# Patient Record
Sex: MALE | Race: WHITE | Employment: PART TIME | URBAN - NONMETROPOLITAN AREA
[De-identification: names, ages, dates, MRNs, and addresses within clinical notes are randomized per-mention and may not be internally consistent; named-entity substitution may affect disease eponyms.]

---

## 2018-02-03 ENCOUNTER — HOSPITAL ENCOUNTER (EMERGENCY)
Age: 68
Discharge: HOME | End: 2018-02-03
Payer: COMMERCIAL

## 2018-02-03 VITALS
WEIGHT: 198.8 LBS | HEART RATE: 88 BPM | BODY MASS INDEX: 29.36 KG/M2 | OXYGEN SATURATION: 96 % | DIASTOLIC BLOOD PRESSURE: 83 MMHG | RESPIRATION RATE: 18 BRPM | TEMPERATURE: 97.9 F | SYSTOLIC BLOOD PRESSURE: 158 MMHG

## 2018-02-03 DIAGNOSIS — J10.1 INFLUENZA A: Primary | ICD-10-CM

## 2018-02-03 DIAGNOSIS — J01.10 ACUTE FRONTAL SINUSITIS, RECURRENCE NOT SPECIFIED: ICD-10-CM

## 2018-02-03 PROCEDURE — 99212 OFFICE O/P EST SF 10 MIN: CPT

## 2018-02-03 PROCEDURE — 99202 OFFICE O/P NEW SF 15 MIN: CPT | Performed by: NURSE PRACTITIONER

## 2018-02-03 RX ORDER — BROMPHENIRAMINE MALEATE, PSEUDOEPHEDRINE HYDROCHLORIDE, AND DEXTROMETHORPHAN HYDROBROMIDE 2; 30; 10 MG/5ML; MG/5ML; MG/5ML
10 SYRUP ORAL 4 TIMES DAILY PRN
Qty: 240 ML | Refills: 0 | Status: SHIPPED | OUTPATIENT
Start: 2018-02-03

## 2018-02-03 RX ORDER — CETIRIZINE HYDROCHLORIDE 10 MG/1
10 TABLET ORAL DAILY
COMMUNITY

## 2018-02-03 RX ORDER — AMOXICILLIN AND CLAVULANATE POTASSIUM 875; 125 MG/1; MG/1
1 TABLET, FILM COATED ORAL 2 TIMES DAILY
Qty: 20 TABLET | Refills: 0 | Status: SHIPPED | OUTPATIENT
Start: 2018-02-03 | End: 2018-02-03

## 2018-02-03 RX ORDER — OSELTAMIVIR PHOSPHATE 75 MG/1
75 CAPSULE ORAL 2 TIMES DAILY
Qty: 10 CAPSULE | Refills: 0 | Status: SHIPPED | OUTPATIENT
Start: 2018-02-03 | End: 2018-02-08

## 2018-02-03 RX ORDER — PREDNISONE 20 MG/1
20 TABLET ORAL 2 TIMES DAILY
Qty: 10 TABLET | Refills: 0 | Status: SHIPPED | OUTPATIENT
Start: 2018-02-03 | End: 2018-02-08

## 2018-02-03 ASSESSMENT — ENCOUNTER SYMPTOMS
SWOLLEN GLANDS: 1
NAUSEA: 1
COLOR CHANGE: 0
ABDOMINAL PAIN: 0
CHEST TIGHTNESS: 1
SORE THROAT: 1
EYE ITCHING: 0
WHEEZING: 1
SINUS PAIN: 1
COUGH: 1
ABDOMINAL DISTENTION: 0
EYE PAIN: 0
SINUS PRESSURE: 1

## 2018-02-03 NOTE — ED TRIAGE NOTES
Pt ambulatory into [facility] with c/o non productive cough and eyes watering that started yesterday. Pt presents with wife with similar symptoms. Pt denies pain at this time.

## 2018-02-03 NOTE — ED PROVIDER NOTES
[id number]  Urgent Care Encounter      CHIEF COMPLAINT       Chief Complaint   Patient presents with    Cough    Other     watery eyes and burns        Nurses Notes reviewed and I agree except as noted in the HPI. HISTORY OF PRESENT ILLNESS   Yonathan Salomon is a 78 y.o. male who presents The history is provided by the patient and the spouse. URI   Presenting symptoms: congestion, cough, facial pain, fatigue, fever and sore throat    Congestion:     Interferes with sleep: yes      Interferes with eating/drinking: yes    Cough:     Cough characteristics:  Barking, hacking and hoarse    Sputum characteristics:  Nondescript    Severity:  Mild    Onset quality:  Sudden    Duration:  3 days    Timing:  Intermittent    Progression:  Worsening    Chronicity:  New  Fatigue:     Severity:  Moderate    Duration:  3 days    Timing:  Intermittent    Progression:  Worsening  Sore throat:     Severity:  Moderate    Onset quality:  Sudden    Duration:  3 days    Timing:  Intermittent    Progression:  Worsening  Severity:  Moderate  Relieved by:  Drinking, OTC medications and rest  Worsened by:  Breathing, certain positions, drinking and eating  Ineffective treatments:  Rest, OTC medications and certain positions  Associated symptoms: arthralgias, headaches, myalgias, sinus pain, swollen glands and wheezing    Risk factors: being elderly, diabetes mellitus, recent illness and sick contacts          REVIEW OF SYSTEMS     Review of Systems   Constitutional: Positive for activity change, appetite change, fatigue and fever. HENT: Positive for congestion, sinus pain, sinus pressure and sore throat. Eyes: Negative for pain and itching. Respiratory: Positive for cough, chest tightness and wheezing. Gastrointestinal: Positive for nausea. Negative for abdominal distention and abdominal pain. Endocrine: Negative for cold intolerance and heat intolerance. Genitourinary: Negative.     Musculoskeletal: Positive for arthralgias and myalgias. Skin: Negative for color change. Allergic/Immunologic: Negative for environmental allergies and food allergies. Neurological: Positive for light-headedness and headaches. Hematological: Positive for adenopathy. Psychiatric/Behavioral: Negative. PAST MEDICAL HISTORY         Diagnosis Date    Arthritis     thumbs    Cancer ([facility] [address] 2016    BCC, left ear    Diabetes mellitus ([facility] [address]     Hypertension        SURGICAL HISTORY     Patient  has a past surgical history that includes Appendectomy (1973); External ear surgery (Left, 1990's); hernia repair (Right, 1990's); Cholecystectomy (2004?); other surgical history (2003/4); Knee arthroscopy (Left, 2014); Tonsillectomy; and Mohs surgery (Left, 12/16/2016). CURRENT MEDICATIONS       Discharge Medication List as of 2/3/2018  5:57 PM      CONTINUE these medications which have NOT CHANGED    Details   Dulaglutide (TRULICITY) 1.5 PG/6.8LK SOPN Inject into the skin Once weeklyHistorical Med      cetirizine (ZYRTEC) 10 MG tablet Take 10 mg by mouth dailyHistorical Med      metFORMIN (GLUCOPHAGE) 500 MG tablet Take 2,000 mg by mouth daily (with breakfast)      sitaGLIPtin (JANUVIA) 100 MG tablet Take 100 mg by mouth daily      allopurinol (ZYLOPRIM) 300 MG tablet Take 300 mg by mouth daily      lisinopril (PRINIVIL;ZESTRIL) 40 MG tablet Take 40 mg by mouth daily      hydrochlorothiazide (HYDRODIURIL) 25 MG tablet Take 25 mg by mouth daily             ALLERGIES     Patient is has No Known Allergies. FAMILY HISTORY     Patient's family history is not on file. SOCIAL HISTORY     Patient  reports that he quit smoking about 34 years ago. He started smoking about 48 years ago. He has never used smokeless tobacco. He reports that he drinks alcohol. He reports that he does not use drugs.     PHYSICAL EXAM     ED TRIAGE VITALS  BP: (!) 158/83, Temp: 97.9 °F (36.6 °C), Pulse: 88, Resp: 18, SpO2: 96 %  Physical Exam Nyla,   [address] [id number]  [address]  [phone]    In 3 days  As needed, If symptoms worsen    DISCHARGE MEDICATIONS:  Discharge Medication List as of 2/3/2018  5:57 PM      START taking these medications    Details   predniSONE (DELTASONE) 20 MG tablet Take 1 tablet by mouth 2 times daily for 5 days, Disp-10 tablet, R-0Print      oseltamivir (TAMIFLU) 75 MG capsule Take 1 capsule by mouth 2 times daily for 5 days, Disp-10 capsule, R-0Print      brompheniramine-pseudoephedrine-DM 30-2-10 MG/5ML syrup Take 10 mLs by mouth 4 times daily as needed for Congestion or Cough, Disp-240 mL, R-0Print           Discharge Medication List as of 2/3/2018  5:57 PM          GENEVIEVE Del Rio CNP  02/04/18 [id number]